# Patient Record
Sex: MALE | Race: WHITE | URBAN - METROPOLITAN AREA
[De-identification: names, ages, dates, MRNs, and addresses within clinical notes are randomized per-mention and may not be internally consistent; named-entity substitution may affect disease eponyms.]

---

## 2022-07-21 ENCOUNTER — OFFICE VISIT (OUTPATIENT)
Dept: URBAN - METROPOLITAN AREA CLINIC 13 | Facility: CLINIC | Age: 62
End: 2022-07-21
Payer: COMMERCIAL

## 2022-07-21 DIAGNOSIS — H43.812 VITREOUS DETACHMENT OF LT EYE: ICD-10-CM

## 2022-07-21 DIAGNOSIS — H25.13 AGE-RELATED NUCLEAR CATARACT, BILATERAL: ICD-10-CM

## 2022-07-21 DIAGNOSIS — H43.12 VITREOUS HEMORRHAGE OF LEFT EYE: Primary | ICD-10-CM

## 2022-07-21 PROCEDURE — 92134 CPTRZ OPH DX IMG PST SGM RTA: CPT | Performed by: OPHTHALMOLOGY

## 2022-07-21 PROCEDURE — 99204 OFFICE O/P NEW MOD 45 MIN: CPT | Performed by: OPHTHALMOLOGY

## 2022-07-21 RX ORDER — OFLOXACIN 3 MG/ML
0.3 % SOLUTION/ DROPS OPHTHALMIC
Qty: 5 | Refills: 2 | Status: INACTIVE
Start: 2022-07-21 | End: 2022-10-18

## 2022-07-21 RX ORDER — PREDNISOLONE ACETATE 10 MG/ML
1 % SUSPENSION/ DROPS OPHTHALMIC
Qty: 5 | Refills: 2 | Status: INACTIVE
Start: 2022-07-21 | End: 2022-10-18

## 2022-07-21 ASSESSMENT — INTRAOCULAR PRESSURE
OD: 17
OS: 17

## 2022-07-21 NOTE — IMPRESSION/PLAN
Impression: Vitreous hemorrhage of left eye: H43.12. Left. OCT: no ERM/ME OU Plan: Moderate VH x 1 week in central vitreous cavity causing decreased vision. No definite RT seen with . Discussed risk for RT and RD due to acute onset VH. Discussed treatment options (PPV, observation). Patient elects PPV. Discussed possible need for gas bubble with RT and altitude precautions.   

Plan: PPV, poss EL/GAS x VH OS